# Patient Record
Sex: FEMALE
[De-identification: names, ages, dates, MRNs, and addresses within clinical notes are randomized per-mention and may not be internally consistent; named-entity substitution may affect disease eponyms.]

---

## 2021-03-11 ENCOUNTER — NURSE TRIAGE (OUTPATIENT)
Dept: OTHER | Facility: CLINIC | Age: 56
End: 2021-03-11

## 2021-03-11 NOTE — TELEPHONE ENCOUNTER
Answer Assessment - Initial Assessment Questions  1. REASON FOR CALL or QUESTION: \"What is your reason for calling today? \" or \"How can I best help you? \" or \"What question do you have that I can help answer? \"      Had covid 5 weeks ago and wants to know when she can get the vaccination. According to the cdc:  Vaccination of persons with current SARS-CoV-2 infection should be deferred until the person has recovered from acute  illness and they can discontinue isolation. This recommendation applies to persons who develop SARS-CoV-2 infection  before receiving any vaccine doses as well as those who develop SARS-CoV-2 infection after the first dose but before receipt of  the second dose.     Protocols used: INFORMATION ONLY CALL - NO TRIAGE-ADULT-    Chart opened in error